# Patient Record
Sex: MALE | Race: WHITE | Employment: FULL TIME | URBAN - METROPOLITAN AREA
[De-identification: names, ages, dates, MRNs, and addresses within clinical notes are randomized per-mention and may not be internally consistent; named-entity substitution may affect disease eponyms.]

---

## 2017-12-19 RX ORDER — ROSUVASTATIN CALCIUM 20 MG/1
20 TABLET, COATED ORAL DAILY
COMMUNITY

## 2017-12-20 ENCOUNTER — ANESTHESIA EVENT (OUTPATIENT)
Dept: SURGERY | Age: 57
End: 2017-12-20
Payer: COMMERCIAL

## 2017-12-20 ENCOUNTER — ANESTHESIA (OUTPATIENT)
Dept: SURGERY | Age: 57
End: 2017-12-20
Payer: COMMERCIAL

## 2017-12-20 ENCOUNTER — HOSPITAL ENCOUNTER (OUTPATIENT)
Age: 57
Setting detail: OBSERVATION
Discharge: HOME | End: 2017-12-21
Attending: ORTHOPAEDIC SURGERY | Admitting: ORTHOPAEDIC SURGERY
Payer: COMMERCIAL

## 2017-12-20 ENCOUNTER — APPOINTMENT (OUTPATIENT)
Dept: GENERAL RADIOLOGY | Age: 57
End: 2017-12-20
Attending: ORTHOPAEDIC SURGERY
Payer: COMMERCIAL

## 2017-12-20 DIAGNOSIS — M48.02 CERVICAL STENOSIS OF SPINAL CANAL: Primary | ICD-10-CM

## 2017-12-20 LAB
ABO + RH BLD: NORMAL
BLOOD GROUP ANTIBODIES SERPL: NORMAL
GLUCOSE BLD STRIP.AUTO-MCNC: 86 MG/DL (ref 65–100)
SERVICE CMNT-IMP: NORMAL
SPECIMEN EXP DATE BLD: NORMAL

## 2017-12-20 PROCEDURE — 74011250636 HC RX REV CODE- 250/636: Performed by: ORTHOPAEDIC SURGERY

## 2017-12-20 PROCEDURE — 77030026438 HC STYL ET INTUB CARD -A: Performed by: NURSE ANESTHETIST, CERTIFIED REGISTERED

## 2017-12-20 PROCEDURE — 77030018846 HC SOL IRR STRL H20 ICUM -A: Performed by: ORTHOPAEDIC SURGERY

## 2017-12-20 PROCEDURE — C1713 ANCHOR/SCREW BN/BN,TIS/BN: HCPCS | Performed by: ORTHOPAEDIC SURGERY

## 2017-12-20 PROCEDURE — 82962 GLUCOSE BLOOD TEST: CPT

## 2017-12-20 PROCEDURE — 74011000272 HC RX REV CODE- 272: Performed by: ORTHOPAEDIC SURGERY

## 2017-12-20 PROCEDURE — 74011250637 HC RX REV CODE- 250/637: Performed by: PHYSICIAN ASSISTANT

## 2017-12-20 PROCEDURE — 36415 COLL VENOUS BLD VENIPUNCTURE: CPT | Performed by: ORTHOPAEDIC SURGERY

## 2017-12-20 PROCEDURE — 76210000016 HC OR PH I REC 1 TO 1.5 HR: Performed by: ORTHOPAEDIC SURGERY

## 2017-12-20 PROCEDURE — 74011000250 HC RX REV CODE- 250

## 2017-12-20 PROCEDURE — 76010000162 HC OR TIME 1.5 TO 2 HR INTENSV-TIER 1: Performed by: ORTHOPAEDIC SURGERY

## 2017-12-20 PROCEDURE — 99218 HC RM OBSERVATION: CPT

## 2017-12-20 PROCEDURE — 77030031139 HC SUT VCRL2 J&J -A: Performed by: ORTHOPAEDIC SURGERY

## 2017-12-20 PROCEDURE — 74011000250 HC RX REV CODE- 250: Performed by: ORTHOPAEDIC SURGERY

## 2017-12-20 PROCEDURE — 77030004391 HC BUR FLUT MEDT -C: Performed by: ORTHOPAEDIC SURGERY

## 2017-12-20 PROCEDURE — 72020 X-RAY EXAM OF SPINE 1 VIEW: CPT

## 2017-12-20 PROCEDURE — 77030020268 HC MISC GENERAL SUPPLY: Performed by: ORTHOPAEDIC SURGERY

## 2017-12-20 PROCEDURE — 74011250636 HC RX REV CODE- 250/636

## 2017-12-20 PROCEDURE — 77030013079 HC BLNKT BAIR HGGR 3M -A: Performed by: NURSE ANESTHETIST, CERTIFIED REGISTERED

## 2017-12-20 PROCEDURE — 77030013567 HC DRN WND RESERV BARD -A: Performed by: ORTHOPAEDIC SURGERY

## 2017-12-20 PROCEDURE — 77030029099 HC BN WAX SSPC -A: Performed by: ORTHOPAEDIC SURGERY

## 2017-12-20 PROCEDURE — 77030012893

## 2017-12-20 PROCEDURE — 77030011267 HC ELECTRD BLD COVD -A: Performed by: ORTHOPAEDIC SURGERY

## 2017-12-20 PROCEDURE — 77030003666 HC NDL SPINAL BD -A: Performed by: ORTHOPAEDIC SURGERY

## 2017-12-20 PROCEDURE — 77030032490 HC SLV COMPR SCD KNE COVD -B: Performed by: ORTHOPAEDIC SURGERY

## 2017-12-20 PROCEDURE — 74011250636 HC RX REV CODE- 250/636: Performed by: PHYSICIAN ASSISTANT

## 2017-12-20 PROCEDURE — 86900 BLOOD TYPING SEROLOGIC ABO: CPT | Performed by: ORTHOPAEDIC SURGERY

## 2017-12-20 PROCEDURE — 76060000034 HC ANESTHESIA 1.5 TO 2 HR: Performed by: ORTHOPAEDIC SURGERY

## 2017-12-20 PROCEDURE — 77030012406 HC DRN WND PENRS BARD -A: Performed by: ORTHOPAEDIC SURGERY

## 2017-12-20 PROCEDURE — 77030019908 HC STETH ESOPH SIMS -A: Performed by: NURSE ANESTHETIST, CERTIFIED REGISTERED

## 2017-12-20 PROCEDURE — 77030018836 HC SOL IRR NACL ICUM -A: Performed by: ORTHOPAEDIC SURGERY

## 2017-12-20 PROCEDURE — 77030014007 HC SPNG HEMSTAT J&J -B: Performed by: ORTHOPAEDIC SURGERY

## 2017-12-20 PROCEDURE — 74011250636 HC RX REV CODE- 250/636: Performed by: ANESTHESIOLOGY

## 2017-12-20 PROCEDURE — 77030012464: Performed by: ORTHOPAEDIC SURGERY

## 2017-12-20 PROCEDURE — 77030008684 HC TU ET CUF COVD -B: Performed by: NURSE ANESTHETIST, CERTIFIED REGISTERED

## 2017-12-20 PROCEDURE — 77030020782 HC GWN BAIR PAWS FLX 3M -B

## 2017-12-20 DEVICE — 4.0MM RIGID SCREW, SELF-TAPPING, 12MM
Type: IMPLANTABLE DEVICE | Site: SPINE CERVICAL | Status: FUNCTIONAL
Brand: ASSURE

## 2017-12-20 DEVICE — SET SCREW FOR RIGID SCREWS
Type: IMPLANTABLE DEVICE | Site: SPINE CERVICAL | Status: FUNCTIONAL
Brand: ASSURE

## 2017-12-20 DEVICE — ASSURE CERVICAL PLATE 1-LEVEL, 16MM
Type: IMPLANTABLE DEVICE | Site: SPINE CERVICAL | Status: FUNCTIONAL
Brand: ASSURE

## 2017-12-20 DEVICE — BONE SPCR CERV LORDC 7X9MM --: Type: IMPLANTABLE DEVICE | Site: SPINE CERVICAL | Status: FUNCTIONAL

## 2017-12-20 RX ORDER — CEFAZOLIN SODIUM/WATER 2 G/20 ML
2 SYRINGE (ML) INTRAVENOUS ONCE
Status: COMPLETED | OUTPATIENT
Start: 2017-12-20 | End: 2017-12-20

## 2017-12-20 RX ORDER — HYDROMORPHONE HCL IN 0.9% NACL 15 MG/30ML
PATIENT CONTROLLED ANALGESIA VIAL INTRAVENOUS
Status: DISCONTINUED | OUTPATIENT
Start: 2017-12-20 | End: 2017-12-21 | Stop reason: HOSPADM

## 2017-12-20 RX ORDER — FENTANYL CITRATE 50 UG/ML
50 INJECTION, SOLUTION INTRAMUSCULAR; INTRAVENOUS AS NEEDED
Status: DISCONTINUED | OUTPATIENT
Start: 2017-12-20 | End: 2017-12-20 | Stop reason: HOSPADM

## 2017-12-20 RX ORDER — SODIUM CHLORIDE 0.9 % (FLUSH) 0.9 %
5-10 SYRINGE (ML) INJECTION EVERY 8 HOURS
Status: DISCONTINUED | OUTPATIENT
Start: 2017-12-20 | End: 2017-12-21 | Stop reason: HOSPADM

## 2017-12-20 RX ORDER — SODIUM CHLORIDE 9 MG/ML
25 INJECTION, SOLUTION INTRAVENOUS CONTINUOUS
Status: DISCONTINUED | OUTPATIENT
Start: 2017-12-20 | End: 2017-12-20 | Stop reason: HOSPADM

## 2017-12-20 RX ORDER — ACETAMINOPHEN 325 MG/1
650 TABLET ORAL
Status: DISCONTINUED | OUTPATIENT
Start: 2017-12-20 | End: 2017-12-21 | Stop reason: HOSPADM

## 2017-12-20 RX ORDER — SODIUM CHLORIDE 0.9 % (FLUSH) 0.9 %
5-10 SYRINGE (ML) INJECTION AS NEEDED
Status: DISCONTINUED | OUTPATIENT
Start: 2017-12-20 | End: 2017-12-20 | Stop reason: HOSPADM

## 2017-12-20 RX ORDER — LIDOCAINE HYDROCHLORIDE 10 MG/ML
0.1 INJECTION, SOLUTION EPIDURAL; INFILTRATION; INTRACAUDAL; PERINEURAL AS NEEDED
Status: DISCONTINUED | OUTPATIENT
Start: 2017-12-20 | End: 2017-12-20 | Stop reason: HOSPADM

## 2017-12-20 RX ORDER — ONDANSETRON 2 MG/ML
INJECTION INTRAMUSCULAR; INTRAVENOUS AS NEEDED
Status: DISCONTINUED | OUTPATIENT
Start: 2017-12-20 | End: 2017-12-20 | Stop reason: HOSPADM

## 2017-12-20 RX ORDER — SODIUM CHLORIDE 0.9 % (FLUSH) 0.9 %
5-10 SYRINGE (ML) INJECTION AS NEEDED
Status: DISCONTINUED | OUTPATIENT
Start: 2017-12-20 | End: 2017-12-21 | Stop reason: HOSPADM

## 2017-12-20 RX ORDER — ROSUVASTATIN CALCIUM 10 MG/1
20 TABLET, COATED ORAL DAILY
Status: DISCONTINUED | OUTPATIENT
Start: 2017-12-21 | End: 2017-12-21 | Stop reason: HOSPADM

## 2017-12-20 RX ORDER — FACIAL-BODY WIPES
10 EACH TOPICAL DAILY PRN
Status: DISCONTINUED | OUTPATIENT
Start: 2017-12-22 | End: 2017-12-21 | Stop reason: HOSPADM

## 2017-12-20 RX ORDER — HYDROMORPHONE HYDROCHLORIDE 2 MG/ML
INJECTION, SOLUTION INTRAMUSCULAR; INTRAVENOUS; SUBCUTANEOUS AS NEEDED
Status: DISCONTINUED | OUTPATIENT
Start: 2017-12-20 | End: 2017-12-20 | Stop reason: HOSPADM

## 2017-12-20 RX ORDER — GLYCOPYRROLATE 0.2 MG/ML
INJECTION INTRAMUSCULAR; INTRAVENOUS AS NEEDED
Status: DISCONTINUED | OUTPATIENT
Start: 2017-12-20 | End: 2017-12-20 | Stop reason: HOSPADM

## 2017-12-20 RX ORDER — SODIUM CHLORIDE 9 MG/ML
50 INJECTION, SOLUTION INTRAVENOUS CONTINUOUS
Status: DISCONTINUED | OUTPATIENT
Start: 2017-12-20 | End: 2017-12-20 | Stop reason: HOSPADM

## 2017-12-20 RX ORDER — MIDAZOLAM HYDROCHLORIDE 1 MG/ML
1 INJECTION, SOLUTION INTRAMUSCULAR; INTRAVENOUS AS NEEDED
Status: DISCONTINUED | OUTPATIENT
Start: 2017-12-20 | End: 2017-12-20 | Stop reason: HOSPADM

## 2017-12-20 RX ORDER — OXYCODONE HYDROCHLORIDE 5 MG/1
5 TABLET ORAL
Status: DISCONTINUED | OUTPATIENT
Start: 2017-12-20 | End: 2017-12-21 | Stop reason: HOSPADM

## 2017-12-20 RX ORDER — HYDROCODONE BITARTRATE AND ACETAMINOPHEN 5; 325 MG/1; MG/1
1 TABLET ORAL AS NEEDED
Status: DISCONTINUED | OUTPATIENT
Start: 2017-12-20 | End: 2017-12-20 | Stop reason: HOSPADM

## 2017-12-20 RX ORDER — SODIUM CHLORIDE 9 MG/ML
125 INJECTION, SOLUTION INTRAVENOUS CONTINUOUS
Status: DISCONTINUED | OUTPATIENT
Start: 2017-12-20 | End: 2017-12-21 | Stop reason: HOSPADM

## 2017-12-20 RX ORDER — FENTANYL CITRATE 50 UG/ML
INJECTION, SOLUTION INTRAMUSCULAR; INTRAVENOUS AS NEEDED
Status: DISCONTINUED | OUTPATIENT
Start: 2017-12-20 | End: 2017-12-20 | Stop reason: HOSPADM

## 2017-12-20 RX ORDER — SUCCINYLCHOLINE CHLORIDE 20 MG/ML
INJECTION INTRAMUSCULAR; INTRAVENOUS AS NEEDED
Status: DISCONTINUED | OUTPATIENT
Start: 2017-12-20 | End: 2017-12-20 | Stop reason: HOSPADM

## 2017-12-20 RX ORDER — METHYLPREDNISOLONE ACETATE 40 MG/ML
INJECTION, SUSPENSION INTRA-ARTICULAR; INTRALESIONAL; INTRAMUSCULAR; SOFT TISSUE AS NEEDED
Status: DISCONTINUED | OUTPATIENT
Start: 2017-12-20 | End: 2017-12-20 | Stop reason: HOSPADM

## 2017-12-20 RX ORDER — ROCURONIUM BROMIDE 10 MG/ML
INJECTION, SOLUTION INTRAVENOUS AS NEEDED
Status: DISCONTINUED | OUTPATIENT
Start: 2017-12-20 | End: 2017-12-20 | Stop reason: HOSPADM

## 2017-12-20 RX ORDER — SODIUM CHLORIDE, SODIUM LACTATE, POTASSIUM CHLORIDE, CALCIUM CHLORIDE 600; 310; 30; 20 MG/100ML; MG/100ML; MG/100ML; MG/100ML
INJECTION, SOLUTION INTRAVENOUS
Status: DISCONTINUED | OUTPATIENT
Start: 2017-12-20 | End: 2017-12-20 | Stop reason: HOSPADM

## 2017-12-20 RX ORDER — DIPHENHYDRAMINE HYDROCHLORIDE 50 MG/ML
12.5 INJECTION, SOLUTION INTRAMUSCULAR; INTRAVENOUS
Status: DISCONTINUED | OUTPATIENT
Start: 2017-12-20 | End: 2017-12-21 | Stop reason: HOSPADM

## 2017-12-20 RX ORDER — ONDANSETRON 2 MG/ML
4 INJECTION INTRAMUSCULAR; INTRAVENOUS AS NEEDED
Status: DISCONTINUED | OUTPATIENT
Start: 2017-12-20 | End: 2017-12-20 | Stop reason: HOSPADM

## 2017-12-20 RX ORDER — NEOSTIGMINE METHYLSULFATE 1 MG/ML
INJECTION INTRAVENOUS AS NEEDED
Status: DISCONTINUED | OUTPATIENT
Start: 2017-12-20 | End: 2017-12-20 | Stop reason: HOSPADM

## 2017-12-20 RX ORDER — NALOXONE HYDROCHLORIDE 0.4 MG/ML
0.4 INJECTION, SOLUTION INTRAMUSCULAR; INTRAVENOUS; SUBCUTANEOUS AS NEEDED
Status: DISCONTINUED | OUTPATIENT
Start: 2017-12-20 | End: 2017-12-21 | Stop reason: HOSPADM

## 2017-12-20 RX ORDER — OXYCODONE HYDROCHLORIDE 5 MG/1
10 TABLET ORAL
Status: DISCONTINUED | OUTPATIENT
Start: 2017-12-20 | End: 2017-12-21 | Stop reason: HOSPADM

## 2017-12-20 RX ORDER — PHENYLEPHRINE HCL IN 0.9% NACL 0.4MG/10ML
SYRINGE (ML) INTRAVENOUS AS NEEDED
Status: DISCONTINUED | OUTPATIENT
Start: 2017-12-20 | End: 2017-12-20 | Stop reason: HOSPADM

## 2017-12-20 RX ORDER — DIPHENHYDRAMINE HYDROCHLORIDE 50 MG/ML
12.5 INJECTION, SOLUTION INTRAMUSCULAR; INTRAVENOUS AS NEEDED
Status: DISCONTINUED | OUTPATIENT
Start: 2017-12-20 | End: 2017-12-20 | Stop reason: HOSPADM

## 2017-12-20 RX ORDER — SODIUM CHLORIDE 0.9 % (FLUSH) 0.9 %
5-10 SYRINGE (ML) INJECTION EVERY 8 HOURS
Status: DISCONTINUED | OUTPATIENT
Start: 2017-12-21 | End: 2017-12-21 | Stop reason: HOSPADM

## 2017-12-20 RX ORDER — MIDAZOLAM HYDROCHLORIDE 1 MG/ML
0.5 INJECTION, SOLUTION INTRAMUSCULAR; INTRAVENOUS
Status: DISCONTINUED | OUTPATIENT
Start: 2017-12-20 | End: 2017-12-20 | Stop reason: HOSPADM

## 2017-12-20 RX ORDER — DEXAMETHASONE SODIUM PHOSPHATE 4 MG/ML
INJECTION, SOLUTION INTRA-ARTICULAR; INTRALESIONAL; INTRAMUSCULAR; INTRAVENOUS; SOFT TISSUE AS NEEDED
Status: DISCONTINUED | OUTPATIENT
Start: 2017-12-20 | End: 2017-12-20 | Stop reason: HOSPADM

## 2017-12-20 RX ORDER — SODIUM CHLORIDE 0.9 % (FLUSH) 0.9 %
5-10 SYRINGE (ML) INJECTION EVERY 8 HOURS
Status: DISCONTINUED | OUTPATIENT
Start: 2017-12-20 | End: 2017-12-20 | Stop reason: HOSPADM

## 2017-12-20 RX ORDER — MIDAZOLAM HYDROCHLORIDE 1 MG/ML
INJECTION, SOLUTION INTRAMUSCULAR; INTRAVENOUS AS NEEDED
Status: DISCONTINUED | OUTPATIENT
Start: 2017-12-20 | End: 2017-12-20 | Stop reason: HOSPADM

## 2017-12-20 RX ORDER — SODIUM CHLORIDE, SODIUM LACTATE, POTASSIUM CHLORIDE, CALCIUM CHLORIDE 600; 310; 30; 20 MG/100ML; MG/100ML; MG/100ML; MG/100ML
75 INJECTION, SOLUTION INTRAVENOUS CONTINUOUS
Status: DISCONTINUED | OUTPATIENT
Start: 2017-12-20 | End: 2017-12-20 | Stop reason: HOSPADM

## 2017-12-20 RX ORDER — MORPHINE SULFATE 10 MG/ML
2 INJECTION, SOLUTION INTRAMUSCULAR; INTRAVENOUS
Status: DISCONTINUED | OUTPATIENT
Start: 2017-12-20 | End: 2017-12-20 | Stop reason: HOSPADM

## 2017-12-20 RX ORDER — LIDOCAINE HYDROCHLORIDE 20 MG/ML
INJECTION, SOLUTION EPIDURAL; INFILTRATION; INTRACAUDAL; PERINEURAL AS NEEDED
Status: DISCONTINUED | OUTPATIENT
Start: 2017-12-20 | End: 2017-12-20 | Stop reason: HOSPADM

## 2017-12-20 RX ORDER — AMOXICILLIN 250 MG
1 CAPSULE ORAL 2 TIMES DAILY
Status: DISCONTINUED | OUTPATIENT
Start: 2017-12-20 | End: 2017-12-21 | Stop reason: HOSPADM

## 2017-12-20 RX ORDER — FENTANYL CITRATE 50 UG/ML
25 INJECTION, SOLUTION INTRAMUSCULAR; INTRAVENOUS
Status: DISCONTINUED | OUTPATIENT
Start: 2017-12-20 | End: 2017-12-20 | Stop reason: HOSPADM

## 2017-12-20 RX ORDER — ONDANSETRON 2 MG/ML
4 INJECTION INTRAMUSCULAR; INTRAVENOUS
Status: DISCONTINUED | OUTPATIENT
Start: 2017-12-20 | End: 2017-12-21 | Stop reason: HOSPADM

## 2017-12-20 RX ORDER — SODIUM CHLORIDE, SODIUM LACTATE, POTASSIUM CHLORIDE, CALCIUM CHLORIDE 600; 310; 30; 20 MG/100ML; MG/100ML; MG/100ML; MG/100ML
125 INJECTION, SOLUTION INTRAVENOUS CONTINUOUS
Status: DISCONTINUED | OUTPATIENT
Start: 2017-12-20 | End: 2017-12-20 | Stop reason: HOSPADM

## 2017-12-20 RX ORDER — PROPOFOL 10 MG/ML
INJECTION, EMULSION INTRAVENOUS AS NEEDED
Status: DISCONTINUED | OUTPATIENT
Start: 2017-12-20 | End: 2017-12-20 | Stop reason: HOSPADM

## 2017-12-20 RX ORDER — CEFAZOLIN SODIUM/WATER 2 G/20 ML
2 SYRINGE (ML) INTRAVENOUS EVERY 8 HOURS
Status: COMPLETED | OUTPATIENT
Start: 2017-12-20 | End: 2017-12-21

## 2017-12-20 RX ADMIN — SODIUM CHLORIDE 125 ML/HR: 900 INJECTION, SOLUTION INTRAVENOUS at 17:05

## 2017-12-20 RX ADMIN — DEXAMETHASONE SODIUM PHOSPHATE 8 MG: 4 INJECTION, SOLUTION INTRA-ARTICULAR; INTRALESIONAL; INTRAMUSCULAR; INTRAVENOUS; SOFT TISSUE at 14:14

## 2017-12-20 RX ADMIN — FENTANYL CITRATE 100 MCG: 50 INJECTION, SOLUTION INTRAMUSCULAR; INTRAVENOUS at 14:04

## 2017-12-20 RX ADMIN — ONDANSETRON 4 MG: 2 INJECTION INTRAMUSCULAR; INTRAVENOUS at 15:20

## 2017-12-20 RX ADMIN — NEOSTIGMINE METHYLSULFATE 3 MG: 1 INJECTION INTRAVENOUS at 15:24

## 2017-12-20 RX ADMIN — FENTANYL CITRATE 25 MCG: 50 INJECTION, SOLUTION INTRAMUSCULAR; INTRAVENOUS at 16:45

## 2017-12-20 RX ADMIN — GLYCOPYRROLATE 0.5 MG: 0.2 INJECTION INTRAMUSCULAR; INTRAVENOUS at 15:24

## 2017-12-20 RX ADMIN — HYDROMORPHONE HYDROCHLORIDE 0.4 MG: 2 INJECTION, SOLUTION INTRAMUSCULAR; INTRAVENOUS; SUBCUTANEOUS at 14:21

## 2017-12-20 RX ADMIN — PROPOFOL 200 MG: 10 INJECTION, EMULSION INTRAVENOUS at 14:04

## 2017-12-20 RX ADMIN — HYDROMORPHONE HYDROCHLORIDE 0.2 MG: 2 INJECTION, SOLUTION INTRAMUSCULAR; INTRAVENOUS; SUBCUTANEOUS at 15:41

## 2017-12-20 RX ADMIN — SODIUM CHLORIDE, SODIUM LACTATE, POTASSIUM CHLORIDE, AND CALCIUM CHLORIDE 125 ML/HR: 600; 310; 30; 20 INJECTION, SOLUTION INTRAVENOUS at 12:57

## 2017-12-20 RX ADMIN — ROCURONIUM BROMIDE 10 MG: 10 INJECTION, SOLUTION INTRAVENOUS at 14:04

## 2017-12-20 RX ADMIN — SUCCINYLCHOLINE CHLORIDE 160 MG: 20 INJECTION INTRAMUSCULAR; INTRAVENOUS at 14:04

## 2017-12-20 RX ADMIN — Medication 2 G: at 22:50

## 2017-12-20 RX ADMIN — Medication 40 MCG: at 15:04

## 2017-12-20 RX ADMIN — LIDOCAINE HYDROCHLORIDE 100 MG: 20 INJECTION, SOLUTION EPIDURAL; INFILTRATION; INTRACAUDAL; PERINEURAL at 14:04

## 2017-12-20 RX ADMIN — Medication: at 15:53

## 2017-12-20 RX ADMIN — FENTANYL CITRATE 25 MCG: 50 INJECTION, SOLUTION INTRAMUSCULAR; INTRAVENOUS at 16:21

## 2017-12-20 RX ADMIN — Medication 2 G: at 14:15

## 2017-12-20 RX ADMIN — Medication 120 MCG: at 14:21

## 2017-12-20 RX ADMIN — DOCUSATE SODIUM AND SENNOSIDES 1 TABLET: 8.6; 5 TABLET, FILM COATED ORAL at 18:34

## 2017-12-20 RX ADMIN — ROCURONIUM BROMIDE 30 MG: 10 INJECTION, SOLUTION INTRAVENOUS at 14:11

## 2017-12-20 RX ADMIN — MIDAZOLAM HYDROCHLORIDE 2 MG: 1 INJECTION, SOLUTION INTRAMUSCULAR; INTRAVENOUS at 13:56

## 2017-12-20 RX ADMIN — FENTANYL CITRATE 50 MCG: 50 INJECTION, SOLUTION INTRAMUSCULAR; INTRAVENOUS at 13:56

## 2017-12-20 RX ADMIN — SODIUM CHLORIDE, SODIUM LACTATE, POTASSIUM CHLORIDE, CALCIUM CHLORIDE: 600; 310; 30; 20 INJECTION, SOLUTION INTRAVENOUS at 13:45

## 2017-12-20 RX ADMIN — HYDROMORPHONE HYDROCHLORIDE 0.2 MG: 2 INJECTION, SOLUTION INTRAMUSCULAR; INTRAVENOUS; SUBCUTANEOUS at 15:40

## 2017-12-20 NOTE — IP AVS SNAPSHOT
[address] 
[phone] Patient: Karl Irwin MRN: [id number] TQA:3/53/7942 My Medications TAKE these medications as instructed Instructions Each Dose to Equal  
 Morning Noon Evening Bedtime  
 oxyCODONE-acetaminophen 5-325 mg per tablet Commonly known as:  PERCOCET Your last dose was: Your next dose is: Take 1-2 Tabs by mouth every four (4) hours as needed for Pain. Max Daily Amount: 12 Tabs. Indications: Pain 1-2 Tab  
    
   
   
   
  
 rosuvastatin 20 mg tablet Commonly known as:  CRESTOR Your last dose was: Your next dose is: Take 20 mg by mouth daily. 20 mg Where to Get Your Medications Information on where to get these meds will be given to you by the nurse or doctor. ! Ask your nurse or doctor about these medications  
  oxyCODONE-acetaminophen 5-325 mg per tablet

## 2017-12-20 NOTE — IP AVS SNAPSHOT
2700 [facility] [address] [place] [address] 
[phone] Patient: Dolly Ortega MRN: [id number] QEE:9/32/0803 About your hospitalization You were admitted on:  December 20, 2017 You last received care in the:  [address] [place] [address] You were discharged on:  December 21, 2017 Why you were hospitalized Your primary diagnosis was:  Not on File Your diagnoses also included:  Cervical Stenosis Of Spinal Canal  
  
Things You Need To Do (next 8 weeks) Follow up with Jessica Rock MD  
  
Phone:  [phone] Where:  [address] [place], [address] [facility], 6[address] [facility] Discharge Orders None A check jeremy indicates which time of day the medication should be taken. My Medications TAKE these medications as instructed Instructions Each Dose to Equal  
 Morning Noon Evening Bedtime  
 oxyCODONE-acetaminophen 5-325 mg per tablet Commonly known as:  PERCOCET Your last dose was: Your next dose is: Take 1-2 Tabs by mouth every four (4) hours as needed for Pain. Max Daily Amount: 12 Tabs. Indications: Pain 1-2 Tab  
    
   
   
   
  
 rosuvastatin 20 mg tablet Commonly known as:  CRESTOR Your last dose was: Your next dose is: Take 20 mg by mouth daily. 20 mg Where to Get Your Medications Information on where to get these meds will be given to you by the nurse or doctor. ! Ask your nurse or doctor about these medications  
  oxyCODONE-acetaminophen 5-325 mg per tablet Discharge Instructions [facility] KONRAD Harding P.A.-C Wendolyn Kappa, PA-C 
[phone] Neck Surgery Discharge Instructions Activities ? You are going home a well person, be as active as possible.   Your only exercise should be walking. Start with short frequent walks and increase your walking distance each day. Limit the amount of time you sit to 20-30 minute intervals. Sitting for prolonged periods of time will be uncomfortable for you following your surgery. ? Do not lift anything over ten pounds, and do not do any over-the-head activities. ? Do not do any neck exercises until you have been instructed by your doctor. ? When you are in the bed, you may lay on your back or on either side. Do not lay on your stomach. Diet ? You may resume your normal diet. If your throat is sore, you may want to eat soft foods for a few days. Be sure to drink plenty of fluids, it is important to keep yourself hydrated. ? Avoid alcoholic beverages and tobacco products. Medications ? Do not take anti-inflammatory medications or aspirin unless instructed by your physician. ? Take your pain medication as directed. ? It is important to have regular bowel movements. Pain medications may cause constipation. Stool softeners, prune juice, and increasing your water and fiber intake may help in preventing constipation. ? Laxatives should only be used if the above preventative measures have failed and you still have not had a bowel movement after three days. Driving ? You may not drive or return to work until instructed by your physician. However, you may ride in the car for short periods of time. Neck collar ? You are required to wear your cervical collar at all times. You may remove the collar long enough to change the pads when needed and to change your dressing each day. ? Do not bend or twist your neck when your collar is removed. It is best to have someone assist you when changing the pads and your dressing to prevent you from bending your neck. Showering ? You may shower in approximately 4 days after your surgery if your incision is not draining. ? Leave the dressing on during your shower allowing the water to run over it. Once you get out of the shower, remove the old dressing, pat incision dry and put on a clean dressing. ? Do not submerse your incision in a tub or pool. Neck collars need to be worn even while in the shower. Caring for your incision ? Change the dressing on your incision everyday for one week. If the drainage has stopped, you may leave the incision open to air. Wash your hands before and after each dressing change. ? You will probably have steri strips on your incision (small, white pieces of tape). Do not pull the steri strips; they will fall off on their own after several days. If you have sutures or staples, they will be removed when you see your physician. ? Do not rub or apply any lotions or ointments to your incision site. Stockings ? You have been given T.E.D. stockings to wear. Continue to wear these for 7 days after your discharge. Put them on in the morning and take them off at night. They are used to increase your circulation and prevent blood clots from forming in your legs. Follow Up 
? Once you are home, call your physicians office to schedule an appointment for your two-week postoperative visit. Contact Dr. Ambrocio Burks at [phone], [phone]. Notify your physician if you develop any of the following conditions: 
? Fever above 101 degrees for 24 hours. ? Nausea or vomiting. ? Severe headache. 
? Inability to urinate. ? Loss of bowel or bladder function. ? Changes in sensation in your extremities (numbness, tingling, loss of color) that was not present before your surgery. ? Severe pain or pain not relieved by medications. ? Redness, swelling, or drainage from your incision. ? Persistent pain in the chest or calf of either leg. 
? Increased weakness (if this is greater than before your surgery). Physician Phone Numbers: [place]as Tfixsnszztya-[phone] (after hours call [phone]) Dr Rachel Miguel, Intermolecular Announcement We are excited to announce that we are making your provider's discharge notes available to you in Intermolecular. You will see these notes when they are completed and signed by the physician that discharged you from your recent hospital stay. If you have any questions or concerns about any information you see in Intermolecular, please call the Health Information Department where you were seen or reach out to your Primary Care Provider for more information about your plan of care. Introducing [facility]! Ya Torres introduces [employer]termolecular patient portal. Now you can access parts of your medical record, email your doctor's office, and request medication refills online. 1. In your internet browser, go to [email or web address] 2. Click on the First Time User? Click Here link in the Sign In box. You will see the New Member Sign Up page. 3. Enter your Intermolecular Access Code exactly as it appears below. You will not need to use this code after youve completed the sign-up process. If you do not sign up before the expiration date, you must request a new code. · Intermolecular Access Code: [email or web address] Expires: 3/21/2018  8:49 AM 
 
4. Enter the last four digits of your Social Security Number (xxxx) and Date of Birth (mm/dd/yyyy) as indicated and click Submit. You will be taken to the next sign-up page. 5. Create a Fitwallt ID. This will be your Intermolecular login ID and cannot be changed, so think of one that is secure and easy to remember. 6. Create a Intermolecular password. You can change your password at any time. 7. Enter your Password Reset Question and Answer. This can be used at a later time if you forget your password. 8. Enter your e-mail address. You will receive e-mail notification when new information is available in [address] 9. Click Sign Up. You can now view and download portions of your medical record. 10. Click the Download Summary menu link to download a portable copy of your medical information. If you have questions, please visit the Frequently Asked Questions section of the [employer] website. Remember, [facility] [facility] is NOT to be used for urgent needs. For medical emergencies, dial 911. Now available from your iPhone and Android! Providers Seen During Your Hospitalization Provider Specialty Primary office phone Bon Brandon MD Orthopedic Surgery [phone] Your Primary Care Physician (PCP) Primary Care Physician Office Phone Office Fax Sarah Denny [phone] You are allergic to the following No active allergies Recent Documentation Height Weight BMI Smoking Status 1.905 m 97.5 kg 26.87 kg/m2 Never Smoker Emergency Contacts Name Discharge Info Relation Home Work Mobile Jordyn Figueroa DISCHARGE CAREGIVER [3] Spouse [3]   [phone] Patient Belongings The following personal items are in your possession at time of discharge: 
  Dental Appliances: Other (comment) (implant lower front tooth)  Visual Aid: Glasses, With patient             Clothing: Other (comment) (clothing)    Other Valuables: Brace (neck brace to OR) Please provide this summary of care documentation to your next provider. Signatures-by signing, you are acknowledging that this After Visit Summary has been reviewed with you and you have received a copy. Patient Signature:  ____________________________________________________________ Date:  ____________________________________________________________  
  
Deepak Rondon Provider Signature:  ____________________________________________________________ Date:  ____________________________________________________________

## 2017-12-20 NOTE — PERIOP NOTES
TRANSFER - OUT REPORT:    Verbal report given to Abrahan Jain (name) on Honey Hadley  being transferred to [facility] [id number] (unit) for routine post - op       Report consisted of patients Situation, Background, Assessment and   Recommendations(SBAR). Time Pre op antibiotic given:1415  Anesthesia Stop time: 3762  Soria Present on Transfer to floor:n  Order for Soria on Chart:n    Information from the following report(s) SBAR, OR Summary, Intake/Output, MAR and Accordion was reviewed with the receiving nurse. Opportunity for questions and clarification was provided. Is the patient on 02? YES       L/Min 2       Other     Is the patient on a monitor? NO    Is the nurse transporting with the patient? NO    Surgical Waiting Area notified of patient's transfer from PACU? YES      The following personal items collected during your admission accompanied patient upon transfer:   Dental Appliance: Dental Appliances: Other (comment) (implant lower front tooth)  Vision: Visual Aid: Glasses  Hearing Aid:    Jewelry:    Clothing: Clothing: Other (comment) (clothing)  Other Valuables:  Other Valuables: Brace (neck brace to OR)  Valuables sent to safe:      Clothes to floor with pt

## 2017-12-20 NOTE — BRIEF OP NOTE
BRIEF OPERATIVE NOTE    Date of Procedure: 12/20/2017     Preoperative Diagnosis: HNP   STENOSIS C5-6    Postoperative Diagnosis: HNP   STENOSIS C5-6      Procedure(s):   ANTERIOR CERVICAL DISCECTOMY WITH FUSION C5-6    Surgeon(s) and Role:     * Tisha Barton MD - Primary         Assistant Staff:  Physician Assistant: Gokul Carroll PA-C    Surgical Staff:  Circ-1: Génesis Cerrato RN  Physician Assistant: Callie Pappas PA-C  Scrub RN-1: Alana Crystal RN  Scrub RN-Relief: Mike May RN    Event Time In   Incision Start 1431   Incision Close 1524     Anesthesia: General   Estimated Blood Loss: 30 cc's  Specimens: * No specimens in log *   Findings: Cervical stenosis   Complications: None    Implants:   Implant Name Type Inv. Item Serial No.  Lot No. LRB No. Used Action   BONE SPCR CERV LORDC 7X9MM --  - [id number]  BONE SPCR CERV LORDC 7X9MM --  [id number] [facility] N/A N/A 1 Implanted   PLATE SPNE CERV 1L ASSURE 16MM --  - SN/A  PLATE SPNE CERV 1L ASSURE 16MM --  N/A [employer] N/A N/A 1 Implanted   SCR SPNE RIDIG ST ASSURE 4X12 --  - SN/A  SCR SPNE RIDIG ST ASSURE 4X12 --  N/A [employer] N/A N/A 4 Implanted   SCR SET RIGID ASSURE --  - SN/A   SCR SET RIGID ASSURE --  N/A [employer] N/A N/A 4 Implanted           June Reynolds, SE[place]

## 2017-12-21 VITALS
HEART RATE: 66 BPM | SYSTOLIC BLOOD PRESSURE: 143 MMHG | RESPIRATION RATE: 16 BRPM | TEMPERATURE: 99 F | DIASTOLIC BLOOD PRESSURE: 85 MMHG | HEIGHT: 75 IN | BODY MASS INDEX: 26.73 KG/M2 | OXYGEN SATURATION: 95 % | WEIGHT: 215 LBS

## 2017-12-21 PROCEDURE — 99218 HC RM OBSERVATION: CPT

## 2017-12-21 PROCEDURE — 74011250636 HC RX REV CODE- 250/636: Performed by: PHYSICIAN ASSISTANT

## 2017-12-21 PROCEDURE — 77010033678 HC OXYGEN DAILY

## 2017-12-21 PROCEDURE — 74011250637 HC RX REV CODE- 250/637: Performed by: PHYSICIAN ASSISTANT

## 2017-12-21 RX ORDER — OXYCODONE AND ACETAMINOPHEN 5; 325 MG/1; MG/1
1-2 TABLET ORAL
Qty: 30 TAB | Refills: 0 | Status: SHIPPED | OUTPATIENT
Start: 2017-12-21

## 2017-12-21 RX ADMIN — DOCUSATE SODIUM AND SENNOSIDES 1 TABLET: 8.6; 5 TABLET, FILM COATED ORAL at 09:42

## 2017-12-21 RX ADMIN — ROSUVASTATIN CALCIUM 20 MG: 10 TABLET, FILM COATED ORAL at 09:42

## 2017-12-21 RX ADMIN — BENZOCAINE AND MENTHOL 1 LOZENGE: 15; 3.6 LOZENGE ORAL at 09:21

## 2017-12-21 RX ADMIN — Medication 2 G: at 06:36

## 2017-12-21 RX ADMIN — Medication 10 ML: at 06:37

## 2017-12-21 RX ADMIN — BENZOCAINE AND MENTHOL 1 LOZENGE: 15; 3.6 LOZENGE ORAL at 11:16

## 2017-12-21 RX ADMIN — Medication 10 ML: at 06:36

## 2017-12-21 RX ADMIN — OXYCODONE HYDROCHLORIDE 10 MG: 5 TABLET ORAL at 09:42

## 2017-12-21 NOTE — PROGRESS NOTES
D/C paperwork printed and reviewed with pt. Pt given copy of d/c instructions. Pt verbalized understanding of d/c information. Dsg change reviewed to wife. Wife verbalized understanding of teaching. Extra dsg supplies provided for home. All lines pulled. Pt changing at moment. Awaiting volunteer for transport.

## 2017-12-21 NOTE — PROGRESS NOTES
Primary Nurse Lucas Pina RN and Berry Chavez RN performed a dual skin assessment on this patient Impairment noted- see wound doc flow sheet (neck, surgical). Bryce score is 22.

## 2017-12-21 NOTE — DISCHARGE SUMMARY
[address] [place] [address]   [address]  [phone]     Discharge Summary       PATIENT ID: Roland Monroe  MRN: [id number]   YOB: 1960    DATE OF ADMISSION: 12/20/2017 10:29 AM    DATE OF DISCHARGE: 12/21/2017   PRIMARY CARE PROVIDER: Rochelle Bales MD     CONSULTATIONS: None    PROCEDURES/SURGERIES: Procedure(s):   ANTERIOR CERVICAL DISCECTOMY WITH FUSION C5-6    History of Present Illness:  Roland Monroe is a 61 y.o. male with a history of neck pain and left upper extremity radiculopathy. He was found to have a herniated disc at C5-6. After failing conservative therapy and a discussion of the risks, benefits, alternatives, perioperative course, and potential complications of surgery, he consented to undergo a Procedure(s):   ANTERIOR CERVICAL DISCECTOMY WITH FUSION C5-6. Hospital Course:  Primus Cipro tolerated the procedure well. He was transferred  to the recovery room in stable condition. After a brief stay the patient was then transferred to the Spinal Surgery Unit at [address] [place] [address].  On postoperative day #1, the dressing was clean and dry, he was neurovascularly intact. The patient was afebrile and vital signs were stable. Calves were soft and non-tender bilaterally. He was tolerating a diet without dysphagia. His hard cervical collar was in place and fitted properly. His hemovac drain was removed on postoperative day #1. Primus Cipro made satisfactory progress with physical therapy and was discharged to Home in stable condition on postoperative day 1.   He was provided with routine postoperative instructions and advised to follow up with Joan Singh MD in 2 weeks following discharge from the hospital.      FOLLOW UP APPOINTMENTS:    Follow-up Information     Follow up With Details Comments [address], [place]   [address] [facility] Cordell Lucero   [phone]             ADDITIONAL CARE RECOMMENDATIONS:   Activities   You are going home a well person, be as active as possible. Your only exercise should be walking. Start with short frequent walks and increase your walking distance each day. Limit the amount of time you sit to 20-30 minute intervals. Sitting for prolonged periods of time will be uncomfortable for you following your surgery.  Do not lift anything over ten pounds, and do not do any over-the-head activities.  Do not do any neck exercises until you have been instructed by your doctor.  When you are in the bed, you may lay on your back or on either side. Do not lay on your stomach. Diet   You may resume your normal diet. If your throat is sore, you may want to eat soft foods for a few days. Be sure to drink plenty of fluids, it is important to keep yourself hydrated.  Avoid alcoholic beverages and tobacco products. Medications   Do not take anti-inflammatory medications or aspirin unless instructed by your physician.  Take your pain medication as directed.  It is important to have regular bowel movements. Pain medications may cause constipation. Stool softeners, prune juice, and increasing your water and fiber intake may help in preventing constipation.  Laxatives should only be used if the above preventative measures have failed and you still have not had a bowel movement after three days. Driving   You may not drive or return to work until instructed by your physician. However, you may ride in the car for short periods of time. Neck collar   You are required to wear your cervical collar at all times. You may remove the collar long enough to change the pads when needed and to change your dressing each day.  Do not bend or twist your neck when your collar is removed.   It is best to have someone assist you when changing the pads and your dressing to prevent you from bending your neck.    Showering   You may shower in approximately 4 days after your surgery if your incision is not draining.  Leave the dressing on during your shower allowing the water to run over it. Once you get out of the shower, remove the old dressing, pat incision dry and put on a clean dressing.  Do not submerse your incision in a tub or pool. Neck collars need to be worn even while in the shower. Caring for your incision   Change the dressing on your incision everyday for one week. If the drainage has stopped, you may leave the incision open to air. Wash your hands before and after each dressing change.  You will probably have steri strips on your incision (small, white pieces of tape). Do not pull the steri strips; they will fall off on their own after several days. If you have sutures or staples, they will be removed when you see your physician.  Do not rub or apply any lotions or ointments to your incision site. Stockings   You have been given T.E.D. stockings to wear. Continue to wear these for 7 days after your discharge. Put them on in the morning and take them off at night. They are used to increase your circulation and prevent blood clots from forming in your legs. Follow Up   Once you are home, call your physicians office to schedule an appointment for your two-week postoperative visit. Contact Dr. Olesya Cuadra at [phone], [phone]. Notify your physician if you develop any of the following conditions:   Fever above 101 degrees for 24 hours.  Nausea or vomiting.  Severe headache.  Inability to urinate.  Loss of bowel or bladder function.  Changes in sensation in your extremities (numbness, tingling, loss of color) that was not present before your surgery.  Severe pain or pain not relieved by medications.  Redness, swelling, or drainage from your incision.  Persistent pain in the chest or calf of either leg.    Increased weakness (if this is greater than before your surgery). Physician Phone Numbers:  Tristan Parents (after hours call [phone])  Dr Belinda Colon:  Discharge Medication List as of 12/21/2017 12:11 PM      START taking these medications    Details   oxyCODONE-acetaminophen (PERCOCET) 5-325 mg per tablet Take 1-2 Tabs by mouth every four (4) hours as needed for Pain. Max Daily Amount: 12 Tabs. Indications: Pain, Print, Disp-30 Tab, R-0         CONTINUE these medications which have NOT CHANGED    Details   rosuvastatin (CRESTOR) 20 mg tablet Take 20 mg by mouth daily. , Historical Med             PHYSICAL EXAMINATION AT DISCHARGE:  General: Pleasant, alert, cooperative, no distress. EENT: EOMI. Anicteric sclerae. Oral mucous moist, oropharynx benign. Resp: CTA bilaterally. No wheezing/rhonchi/rales. No accessory muscle use. CV: Regular rhythm, normal rate, no murmurs, gallops, rubs. No cyanosis or clubbing. No edema appreciated in the extremities. Gastrointestinal:  Soft, non-tender, non-distended. normoactive bowel sounds, no hepatosplenomegaly  Neurological: Follows commands. SPARROW. Speech clear. Sensation intact to light touch. Motor: unchanged C5-T1 and L2-S1. Musculoskeletal:  Calves soft, supple, non-tender upon palpation or with passive stretch. Psych: Good insight. Not anxious nor agitated. Skin: Good turgor. No rashes or lesions. Incision - clean, dry and intact. No significant erythema or swelling.       CHRONIC MEDICAL DIAGNOSES:  Problem List as of 12/21/2017  Date Reviewed: 12/21/2017          Codes Class Noted - Resolved    Cervical stenosis of spinal canal ICD-10-CM: M48.02  ICD-9-CM: 723.0  12/20/2017 - Present              Signed:   Remi Bhatti NP  12/21/2017  1:48 PM

## 2017-12-21 NOTE — PROGRESS NOTES
ORTHOPAEDIC CERVICAL FUSION PROGRESS NOTE    NAME: Gavin Alvarado   :  1960   MRN:  [id number]   DATE:  2017    POD: 1 Day Post-Op  S/P: Procedure(s):   ANTERIOR CERVICAL DISCECTOMY WITH FUSION C5-6    SUBJECTIVE:  Patient found sitting up in bed, awake and oriented, Aspen collar in place. States feeling very good this morning -left arm pain/numbness resolved. Sore throat but denies difficulty with speech or swallowing. Denies nausea/vomiting, headache, chest pain or shortness of breath. Has been up/OOB, voiding without difficulty. No acute complaints noted. No results for input(s): HGB, HCT, INR, NA, K, CL, CO2, BUN, CREA, GLU, HGBEXT, HCTEXT in the last 72 hours. No lab exists for component: INREXT  Patient Vitals for the past 12 hrs:   BP Temp Pulse Resp SpO2   17 0328 109/76 97.6 °F (36.4 °C) 79 18 94 %   17 147/89 97.9 °F (36.6 °C) 84 16 93 %   17 143/85 - 80 - -     Drain: 10 cc's    Exam:  Positive strength/ROM bilat upper ext.   Neuro intact   Dressings clean and dry  ASPEN collar inplace / intact    PLAN:  D/C PCA, change to PO pain medications as tolerated  Cepachol lozenges at bedside  Advance to regular diet  Out of bed  D/C to home today   Rx - Oxycodone on chart    Ike Pappas PA-C

## 2017-12-21 NOTE — PROGRESS NOTES
Chart reviewed. Pt admitted on 12/20/17 for ACDF. Met with pt and spouse at the bedside this AM to introduce role of case management, offer support, and discuss discharge planning. Pt lives in own home with his spouse. At baseline, pt is independent with daily living activities. Pt's spouse will provide support post discharge. Verified PCP is Dr. Ayesha Marvin. Preferred pharmacy is [facility] on [address]. Pt prefers to use [facility] here to fill prescriptions prior to discharge. Wife will provide transport home. No barriers to discharge identified/ no home needs. Care Management Interventions  PCP Verified by CM: Yes (Dr. Ayesha Marvin)  Mode of Transport at Discharge:  Other (see comment) (spouse by car)  Transition of Care Consult (CM Consult): Discharge Planning  MyChart Signup: No  Discharge Durable Medical Equipment: No  Health Maintenance Reviewed: Yes  Physical Therapy Consult: No  Occupational Therapy Consult: No  Speech Therapy Consult: No  Current Support Network: Lives with Spouse, Own Home  Confirm Follow Up Transport: Family  Plan discussed with Pt/Family/Caregiver: Yes  Freedom of Choice Offered: Yes  Discharge Location  Discharge Placement: Home with family assistance    MK King

## 2017-12-21 NOTE — DISCHARGE INSTRUCTIONS
[facility], [facility]. KONRAD Collins P.A.-C Jettie Cluck, PA-C  [id number]     Neck Surgery Discharge Instructions    Activities   You are going home a well person, be as active as possible. Your only exercise should be walking. Start with short frequent walks and increase your walking distance each day. Limit the amount of time you sit to 20-30 minute intervals. Sitting for prolonged periods of time will be uncomfortable for you following your surgery.  Do not lift anything over ten pounds, and do not do any over-the-head activities.  Do not do any neck exercises until you have been instructed by your doctor.  When you are in the bed, you may lay on your back or on either side. Do not lay on your stomach. Diet   You may resume your normal diet. If your throat is sore, you may want to eat soft foods for a few days. Be sure to drink plenty of fluids, it is important to keep yourself hydrated.  Avoid alcoholic beverages and tobacco products. Medications   Do not take anti-inflammatory medications or aspirin unless instructed by your physician.  Take your pain medication as directed.  It is important to have regular bowel movements. Pain medications may cause constipation. Stool softeners, prune juice, and increasing your water and fiber intake may help in preventing constipation.  Laxatives should only be used if the above preventative measures have failed and you still have not had a bowel movement after three days. Driving   You may not drive or return to work until instructed by your physician. However, you may ride in the car for short periods of time. Neck collar   You are required to wear your cervical collar at all times. You may remove the collar long enough to change the pads when needed and to change your dressing each day.  Do not bend or twist your neck when your collar is removed.   It is best to have someone assist you when changing the pads and your dressing to prevent you from bending your neck. Showering   You may shower in approximately 4 days after your surgery if your incision is not draining.  Leave the dressing on during your shower allowing the water to run over it. Once you get out of the shower, remove the old dressing, pat incision dry and put on a clean dressing.  Do not submerse your incision in a tub or pool. Neck collars need to be worn even while in the shower. Caring for your incision   Change the dressing on your incision everyday for one week. If the drainage has stopped, you may leave the incision open to air. Wash your hands before and after each dressing change.  You will probably have steri strips on your incision (small, white pieces of tape). Do not pull the steri strips; they will fall off on their own after several days. If you have sutures or staples, they will be removed when you see your physician.  Do not rub or apply any lotions or ointments to your incision site. Stockings   You have been given T.E.D. stockings to wear. Continue to wear these for 7 days after your discharge. Put them on in the morning and take them off at night. They are used to increase your circulation and prevent blood clots from forming in your legs. Follow Up   Once you are home, call your physicians office to schedule an appointment for your two-week postoperative visit. Contact Dr. Anny Ford at [phone], [phone]. Notify your physician if you develop any of the following conditions:   Fever above 101 degrees for 24 hours.  Nausea or vomiting.  Severe headache.  Inability to urinate.  Loss of bowel or bladder function.  Changes in sensation in your extremities (numbness, tingling, loss of color) that was not present before your surgery.  Severe pain or pain not relieved by medications.    Redness, swelling, or drainage from your incision.  Persistent pain in the chest or calf of either leg.  Increased weakness (if this is greater than before your surgery).     Physician Phone Numbers:  Octavia Valdes (after hours call [phone])  Dr Yaritza Peace,

## 2017-12-22 NOTE — OP NOTES
[facility] CARE OP NOTE    rBenda Argueta  MR#: [id number]  : 1960  ACCOUNT #: [de-identified]   DATE OF SERVICE: 2017    PREOPERATIVE DIAGNOSIS:  Cervical foraminal stenosis with associated disk herniation, left side C5-6. POSTOPERATIVE DIAGNOSIS:  Cervical foraminal stenosis with associated disk herniation, left side C5-6. PROCEDURE PERFORMED:  Anterior cervical diskectomy and fusion C5-6 using structural allograft (VG2) supplemented with Globus anterior cervical plating. SURGEON:  Dr. Apple Calero. ASSISTANT:  CORAZON Gonzales.    ANESTHESIA:  General.    ESTIMATED BLOOD LOSS:     SPECIMENS REMOVED:       COMPLICATIONS:     INDICATIONS:  A 58-year-old male seen with complaints of neck, shoulder, arm pain to the left side. He has been symptomatic really for a few months. Ultimately it did resolve. He had an MR scan demonstrating a disk herniation. He had a couple months of relief and then the pain returned severely so. Distribution, again, radicular. Given those factors, an anterior cervical decompression fusion offered. Potential benefits and complications reviewed. DESCRIPTION OF PROCEDURE: The patient brought to the operating room in supine position. General anesthetic was administered. Anterior cervical region prepped and draped in a normal fashion. Preoperative antibiotics administered. Thigh high ELLY hose and sequential pumps applied to the patient's lower extremity. Transverse incision made starting in the midline extending to the border of the sternocleidomastoid muscle. Sharp dissection carried down to the level of the platysma. The platysma split in line with its fibers exposing deep cervical fascia. Deep cervical fascia incised along the medial border of the sternocleidomastoid muscle and blunt finger dissection carried down to the anterior aspect of the spine. X-ray taken to verify the level.   Longus colli muscle elevated. Retractor blades placed. X-ray taken to verify the level. [place] pins used for the purposes of distraction. The annulus incised with a 15 blade, then using a series of curettes and pituitary rongeurs, we backloaded the entire disk inclusive of the cartilaginous endplates. I then performed a wide foraminotomy over the nerve root in the foraminal zone and encountered several small pieces of free disk material, which were evacuated with the suction device. At the end of the decompression, the root was noted to be free. There appeared to be no further evidence to suggest nerve root compression. The endplates were decorticated. A 7 x 9 mm graft chosen. The graft was then packed into the interspace with good overall bony apposition. [place] pins were then removed. A 16 mm plate then used and secured to the anterior surface using series of four screws, each measuring 4 mm in diameter, 12 mm in length with good purchase. These were locked in the plate using the locking screws. Wounds were irrigated. Drain was placed. Platysma closed using 2-0 Vicryl. Skin approximated with running 3-0 Vicryl. The patient awoke from the anesthetic, extubated, and taken to recovery in stable condition.       Joshua SAUCEDA  D: 12/20/2017 15:22     T: 12/21/2017 05:59  JOB #: [id number]

## 2017-12-23 NOTE — ANESTHESIA POSTPROCEDURE EVALUATION
Post-Anesthesia Evaluation and Assessment    Patient: Ale Francisco MRN: [id number]  SSN: [id number]    YOB: 1960  Age: 61 y.o. Sex: male       Cardiovascular Function/Vital Signs  Visit Vitals    /85    Pulse 66    Temp 37.2 °C (99 °F)    Resp 16    Ht 6' 3\" (1.905 m)    Wt 97.5 kg (215 lb)    SpO2 95%    BMI 26.87 kg/m2       Patient is status post general anesthesia for Procedure(s):   ANTERIOR CERVICAL DISCECTOMY WITH FUSION C5-6. Nausea/Vomiting: None    Postoperative hydration reviewed and adequate. Pain:  Pain Scale 1: Numeric (0 - 10) (12/21/17 0328)  Pain Intensity 1: 0 (12/21/17 0328)   Managed    Neurological Status:   Neuro (WDL): Within Defined Limits (12/20/17 1648)  Neuro  LUE Motor Response: Purposeful (12/21/17 0926)  LLE Motor Response: Purposeful (12/21/17 0926)  RUE Motor Response: Purposeful (12/21/17 0926)  RLE Motor Response: Purposeful (12/21/17 0926)   At baseline    Mental Status and Level of Consciousness: Arousable    Pulmonary Status:   O2 Device: Room air (12/21/17 0926)   Adequate oxygenation and airway patent    Complications related to anesthesia: None    Post-anesthesia assessment completed.  No concerns    Signed By: Vevelyn Kocher, MD     December 23, 2017

## (undated) DEVICE — SCREW EXT FIX L14MM FOR DISTRCTN

## (undated) DEVICE — 4-PORT MANIFOLD: Brand: NEPTUNE 2

## (undated) DEVICE — DRILL BIT WITH STOP, 12MM

## (undated) DEVICE — TOOL 14MH30 LEGEND 14CM 3MM: Brand: MIDAS REX ™

## (undated) DEVICE — CODMAN® INTEGRATED BIPOLAR CORD AND TUBING SET FLYING LEADS, ROTARY PUMP: Brand: CODMAN®

## (undated) DEVICE — TEMPORARY FIXATION SCREW

## (undated) DEVICE — SOLUTION IV 250ML 0.9% SOD CHL CLR INJ FLX BG CONT PRT CLSR

## (undated) DEVICE — SYR LR LCK 1ML GRAD NSAF 30ML --

## (undated) DEVICE — 3M™ DURAPORE™ SURGICAL TAPE 1538-3, 3 INCH X 10 YARD (7,5CM X 9,1M), 4 ROLLS/BOX: Brand: 3M™ DURAPORE™

## (undated) DEVICE — DRAIN SURG W7MMXL20CM SIL FULL PERF HUBLESS FLAT RADPQ STRP

## (undated) DEVICE — SUTURE VCRL 2-0 L27IN ABSRB UD CP-2 L26MM 1/2 CIR REV CUT J869H

## (undated) DEVICE — LAMINECTOMY RICHMOND-LF: Brand: MEDLINE INDUSTRIES, INC.

## (undated) DEVICE — NDL SPNE QNCKE 18GX3.5IN LF --

## (undated) DEVICE — SPONGE: SPECIALTY PEANUT XR 100/CS: Brand: MEDICAL ACTION INDUSTRIES

## (undated) DEVICE — BONE WAX WHITE: Brand: BONE WAX WHITE

## (undated) DEVICE — DEVON™ KNEE AND BODY STRAP 60" X 3" (1.5 M X 7.6 CM): Brand: DEVON

## (undated) DEVICE — GOWN,SIRUS,NONRNF,SETINSLV,2XL,18/CS: Brand: MEDLINE

## (undated) DEVICE — MASTISOL ADHESIVE LIQ 2/3ML

## (undated) DEVICE — SOLUTION IV 1000ML 0.9% SOD CHL

## (undated) DEVICE — KENDALL SCD EXPRESS SLEEVES, KNEE LENGTH, MEDIUM: Brand: KENDALL SCD

## (undated) DEVICE — STERILE POLYISOPRENE POWDER-FREE SURGICAL GLOVES WITH EMOLLIENT COATING: Brand: PROTEXIS

## (undated) DEVICE — DRAPE,LAPAROTOMY,T,PEDI,STERILE: Brand: MEDLINE

## (undated) DEVICE — GAUZE SPONGES,12 PLY: Brand: CURITY

## (undated) DEVICE — INFECTION CONTROL KIT SYS

## (undated) DEVICE — PREP SKN PREVAIL 40ML APPL --

## (undated) DEVICE — COVER,TABLE,HEAVY DUTY,60"X90",STRL: Brand: MEDLINE

## (undated) DEVICE — SURGIFOAM SPNG SZ 100

## (undated) DEVICE — Device

## (undated) DEVICE — INSULATED BLADE ELECTRODE: Brand: EDGE

## (undated) DEVICE — SOLUTION IRRIG 1000ML H2O STRL BLT

## (undated) DEVICE — SUTURE VCRL SZ 3-0 L27IN ABSRB UD L24MM PS-1 3/8 CIR PRIM J936H

## (undated) DEVICE — CATH IV AUTOGRD BC GRN 18GA 30 -- INSYTE